# Patient Record
Sex: FEMALE | Race: BLACK OR AFRICAN AMERICAN | NOT HISPANIC OR LATINO | URBAN - METROPOLITAN AREA
[De-identification: names, ages, dates, MRNs, and addresses within clinical notes are randomized per-mention and may not be internally consistent; named-entity substitution may affect disease eponyms.]

---

## 2023-05-30 ENCOUNTER — OFFICE (OUTPATIENT)
Dept: URBAN - METROPOLITAN AREA CLINIC 9 | Facility: CLINIC | Age: 63
End: 2023-05-30

## 2023-05-30 VITALS
WEIGHT: 214 LBS | DIASTOLIC BLOOD PRESSURE: 97 MMHG | SYSTOLIC BLOOD PRESSURE: 147 MMHG | HEIGHT: 66 IN | HEART RATE: 78 BPM | OXYGEN SATURATION: 98 % | SYSTOLIC BLOOD PRESSURE: 149 MMHG | DIASTOLIC BLOOD PRESSURE: 90 MMHG

## 2023-05-30 DIAGNOSIS — I48.91 UNSPECIFIED ATRIAL FIBRILLATION: ICD-10-CM

## 2023-05-30 DIAGNOSIS — E11.9 TYPE 2 DIABETES MELLITUS WITHOUT COMPLICATIONS: ICD-10-CM

## 2023-05-30 DIAGNOSIS — I10 ESSENTIAL (PRIMARY) HYPERTENSION: ICD-10-CM

## 2023-05-30 DIAGNOSIS — Z79.01 LONG TERM (CURRENT) USE OF ANTICOAGULANTS: ICD-10-CM

## 2023-05-30 DIAGNOSIS — K62.5 HEMORRHAGE OF ANUS AND RECTUM: ICD-10-CM

## 2023-05-30 PROCEDURE — 99204 OFFICE O/P NEW MOD 45 MIN: CPT | Performed by: NURSE PRACTITIONER

## 2023-05-30 RX ORDER — POLYETHYLENE GLYCOL 3350, SODIUM SULFATE, SODIUM CHLORIDE, POTASSIUM CHLORIDE, ASCORBIC ACID, SODIUM ASCORBATE 140-9-5.2G
KIT ORAL
Qty: 1 | Refills: 0 | Status: COMPLETED
Start: 2023-05-30 | End: 2023-08-02

## 2023-05-30 RX ORDER — HYDROCORTISONE ACETATE 25 MG/1
SUPPOSITORY RECTAL
Qty: 10 | Refills: 0 | Status: COMPLETED
Start: 2023-05-30 | End: 2023-08-24

## 2023-05-30 NOTE — SERVICEHPINOTES
Ms. Escoto   Is a pleasant 62-year-old  female with a PMH significant for atrial fibrillation on Xarelto p.o. daily managed by PCP, asthma, carotid stenosis, CVA, diabetes mellitus type 2-non-insulin-dependent, fibromyalgia, essential hypertension, osteoarthritis, rheumatoid arthritis, hemorrhoids.  Patient presents due to intermittent rectal bleeding for at least 1 year.  She states she has soft, formed, brown bowel movements without straining or pushing each day but notices blood intermittently in the toilet bowl and on toilet tissue.  None noted in the stool.  She contributes this to her hemorrhoids.  She has not had a colonoscopy since 2006 and she was told at that time everything looked okay and screening colonoscopy recommended in 10 years.  Patient has not had further screening colonoscopy since 2006.  No known primary family history of colon cancer, stomach cancer, other GI issues.  She is denying any perianal pain.  No rectal protrusions.  And again, no lingering, straining on the toilet bowl.  She denies any weight loss.
br
br     Recent labs by PCP at [facility] Dr. Carlson 05/17/2023 BUN 12, creatinine 0.81, calcium 9.3, albumin 4.7, total bilirubin 0.3, alkaline phosphatase 66, AST 14, ALT 17, GFR 83, TSH 0.742, hemoglobin A1c 7.2, hemoglobin 12, hematocrit 38.5, MCV 91, MCH 28.5, platelets 313.

## 2023-08-24 ENCOUNTER — AMBULATORY SURGICAL CENTER (OUTPATIENT)
Dept: URBAN - METROPOLITAN AREA SURGERY 2 | Facility: SURGERY | Age: 63
End: 2023-08-24
Payer: MEDICARE

## 2023-08-24 ENCOUNTER — OFFICE (OUTPATIENT)
Dept: URBAN - METROPOLITAN AREA PATHOLOGY 12 | Facility: PATHOLOGY | Age: 63
End: 2023-08-24
Payer: MEDICARE

## 2023-08-24 VITALS
DIASTOLIC BLOOD PRESSURE: 78 MMHG | OXYGEN SATURATION: 98 % | WEIGHT: 213 LBS | HEART RATE: 64 BPM | SYSTOLIC BLOOD PRESSURE: 144 MMHG | HEIGHT: 66 IN | OXYGEN SATURATION: 96 % | DIASTOLIC BLOOD PRESSURE: 99 MMHG | TEMPERATURE: 98 F | DIASTOLIC BLOOD PRESSURE: 98 MMHG | TEMPERATURE: 97.7 F | HEART RATE: 67 BPM | SYSTOLIC BLOOD PRESSURE: 148 MMHG | DIASTOLIC BLOOD PRESSURE: 78 MMHG | DIASTOLIC BLOOD PRESSURE: 98 MMHG | DIASTOLIC BLOOD PRESSURE: 98 MMHG | DIASTOLIC BLOOD PRESSURE: 78 MMHG | SYSTOLIC BLOOD PRESSURE: 145 MMHG | TEMPERATURE: 98 F | OXYGEN SATURATION: 97 % | SYSTOLIC BLOOD PRESSURE: 125 MMHG | SYSTOLIC BLOOD PRESSURE: 168 MMHG | HEART RATE: 69 BPM | SYSTOLIC BLOOD PRESSURE: 148 MMHG | HEART RATE: 67 BPM | HEART RATE: 64 BPM | RESPIRATION RATE: 18 BRPM | DIASTOLIC BLOOD PRESSURE: 91 MMHG | SYSTOLIC BLOOD PRESSURE: 148 MMHG | OXYGEN SATURATION: 97 % | SYSTOLIC BLOOD PRESSURE: 145 MMHG | SYSTOLIC BLOOD PRESSURE: 145 MMHG | HEART RATE: 64 BPM | DIASTOLIC BLOOD PRESSURE: 90 MMHG | HEART RATE: 66 BPM | SYSTOLIC BLOOD PRESSURE: 168 MMHG | RESPIRATION RATE: 18 BRPM | WEIGHT: 213 LBS | SYSTOLIC BLOOD PRESSURE: 144 MMHG | OXYGEN SATURATION: 98 % | HEART RATE: 70 BPM | TEMPERATURE: 98 F | SYSTOLIC BLOOD PRESSURE: 168 MMHG | DIASTOLIC BLOOD PRESSURE: 90 MMHG | DIASTOLIC BLOOD PRESSURE: 91 MMHG | DIASTOLIC BLOOD PRESSURE: 99 MMHG | HEART RATE: 69 BPM | SYSTOLIC BLOOD PRESSURE: 125 MMHG | OXYGEN SATURATION: 96 % | HEART RATE: 70 BPM | TEMPERATURE: 97.7 F | DIASTOLIC BLOOD PRESSURE: 90 MMHG | SYSTOLIC BLOOD PRESSURE: 144 MMHG | TEMPERATURE: 97.7 F | WEIGHT: 213 LBS | DIASTOLIC BLOOD PRESSURE: 99 MMHG | DIASTOLIC BLOOD PRESSURE: 91 MMHG | OXYGEN SATURATION: 97 % | RESPIRATION RATE: 18 BRPM | HEART RATE: 70 BPM | HEART RATE: 66 BPM | HEART RATE: 69 BPM | OXYGEN SATURATION: 98 % | HEART RATE: 66 BPM | HEIGHT: 66 IN | HEIGHT: 66 IN | SYSTOLIC BLOOD PRESSURE: 125 MMHG | OXYGEN SATURATION: 96 % | HEART RATE: 67 BPM

## 2023-08-24 DIAGNOSIS — K57.30 DIVERTICULOSIS OF LARGE INTESTINE WITHOUT PERFORATION OR ABS: ICD-10-CM

## 2023-08-24 DIAGNOSIS — K92.1 MELENA: ICD-10-CM

## 2023-08-24 DIAGNOSIS — D12.0 BENIGN NEOPLASM OF CECUM: ICD-10-CM

## 2023-08-24 DIAGNOSIS — K63.5 POLYP OF COLON: ICD-10-CM

## 2023-08-24 PROCEDURE — 45380 COLONOSCOPY AND BIOPSY: CPT | Performed by: SPECIALIST

## 2023-08-24 PROCEDURE — 88305 TISSUE EXAM BY PATHOLOGIST: CPT

## 2023-08-24 NOTE — SERVICEHPINOTES
Ms. Escoto   Is a pleasant 62-year-old  female with a PMH significant for atrial fibrillation on Xarelto p.o. daily managed by PCP, asthma, carotid stenosis, CVA, diabetes mellitus type 2-non-insulin-dependent, fibromyalgia, essential hypertension, osteoarthritis, rheumatoid arthritis, hemorrhoids.  Patient presents due to intermittent rectal bleeding for at least 1 year.  She states she has soft, formed, brown bowel movements without straining or pushing each day but notices blood intermittently in the toilet bowl and on toilet tissue.  None noted in the stool.  She contributes this to her hemorrhoids.  She has not had a colonoscopy since 2006 and she was told at that time everything looked okay and screening colonoscopy recommended in 10 years.  Patient has not had further screening colonoscopy since 2006.  No known primary family history of colon cancer, stomach cancer, other GI issues.  She is denying any perianal pain.  No rectal protrusions.  And again, no lingering, straining on the toilet bowl.  She denies any weight loss.

## 2023-08-24 NOTE — SERVICENOTES
Metformin can cause abdominal cramping so could consider a holiday from this drug to see how she does.

## 2023-08-28 LAB
GASTRO ONE PATHOLOGY: PDF REPORT: (no result)

## 2023-11-27 ENCOUNTER — OFFICE (OUTPATIENT)
Dept: URBAN - METROPOLITAN AREA CLINIC 9 | Facility: CLINIC | Age: 63
End: 2023-11-27

## 2023-11-27 VITALS
SYSTOLIC BLOOD PRESSURE: 129 MMHG | DIASTOLIC BLOOD PRESSURE: 82 MMHG | WEIGHT: 217 LBS | OXYGEN SATURATION: 98 % | HEIGHT: 66 IN | HEART RATE: 65 BPM

## 2023-11-27 DIAGNOSIS — I10 ESSENTIAL (PRIMARY) HYPERTENSION: ICD-10-CM

## 2023-11-27 DIAGNOSIS — E11.9 TYPE 2 DIABETES MELLITUS WITHOUT COMPLICATIONS: ICD-10-CM

## 2023-11-27 DIAGNOSIS — K64.8 OTHER HEMORRHOIDS: ICD-10-CM

## 2023-11-27 DIAGNOSIS — I48.91 UNSPECIFIED ATRIAL FIBRILLATION: ICD-10-CM

## 2023-11-27 PROCEDURE — 99213 OFFICE O/P EST LOW 20 MIN: CPT | Performed by: NURSE PRACTITIONER

## 2023-11-27 RX ORDER — HYDROCORTISONE ACETATE 25 MG/1
SUPPOSITORY RECTAL
Qty: 10 | Refills: 1 | Status: ACTIVE
Start: 2023-11-27